# Patient Record
(demographics unavailable — no encounter records)

---

## 2025-03-19 NOTE — PROCEDURE
[FreeTextEntry1] : X-ray Report: (Right foot - 3 views) I got x-rays at Dr. Horner's office and I compared them. There is no shift or change. In fact it looks like he is sort of auto-fusing in certain areas of the 1st and 2nd ray. There is consolidation. He has medial calcinosis or calcification of the vasculature consistent with renal disease.

## 2025-03-19 NOTE — PHYSICAL EXAM
[1+] : left foot dorsalis pedis 1+ [Position Sense Dec.] : diminished position sense at the level of the toes [Diminished Throughout Right Foot] : diminished sensation with monofilament testing throughout right foot [Diminished Throughout Left Foot] : diminished sensation with monofilament testing throughout left foot [Delayed in the Right Toes] : capillary refills normal in right toes [Delayed in the Left Toes] : capillary refills normal in the left toes [FreeTextEntry3] : Positive hair growth to all digits. [de-identified] : He has a flatfoot Charcot of the right side. there is no bony prominence and at the midfoot there is no prominence or skin irritation. He does have good signs of consolidation and even fusion about the 1st met. cuneiform joint. He has a sub 1 keratosis. He has slight sensitivity towards the 4th, 5th cuboid articulation. [FreeTextEntry4] : absent vibratory  [FreeTextEntry8] : absent vibratory  [FreeTextEntry1] : Peripheral neuropathy.

## 2025-03-19 NOTE — HISTORY OF PRESENT ILLNESS
[FreeTextEntry1] : Patient presents today for multiple fractures of the right midfoot, Charcot type injury. He has peripheral neuropathy and renal disease. He does not have diabetes. He is on dialysis. He noticed some increase in pain although it is recently feeling better.

## 2025-03-19 NOTE — ASSESSMENT
[FreeTextEntry1] : Impression: Flatfoot. Charcot foot. Pain.  Treatment:  There is certainly no need for surgery. He has good stability type shoes with orthotics that are really helpful, and I think he should continue to use. He does have a gauntlet type brace, but he doesn't like to use it. I trimmed the keratosis. I recommended Aquaphor to this area. I sent him for P4 topical cream and recommended a silicon impregnated sleeve to use just to give a little more support and cushion to the area. I think he should use slippers in the house, and this should be monitored on a yearly basis.

## 2025-03-19 NOTE — CONSULT LETTER
[Dear  ___] : Dear  [unfilled], [Courtesy Letter:] : I had the pleasure of seeing your patient, [unfilled], in my office today. [Please see my note below.] : Please see my note below. [Consult Closing:] : Thank you very much for allowing me to participate in the care of this patient.  If you have any questions, please do not hesitate to contact me. [Sincerely,] : Sincerely, [FreeTextEntry2] : Ramakrishna Horner DPM [phone] [place] [address] Fax: [phone]  [FreeTextEntry3] : Caleb Sarmiento DPM

## 2025-03-19 NOTE — PHYSICAL EXAM
[1+] : left foot dorsalis pedis 1+ [Position Sense Dec.] : diminished position sense at the level of the toes [Diminished Throughout Right Foot] : diminished sensation with monofilament testing throughout right foot [Diminished Throughout Left Foot] : diminished sensation with monofilament testing throughout left foot [Delayed in the Right Toes] : capillary refills normal in right toes [Delayed in the Left Toes] : capillary refills normal in the left toes [FreeTextEntry3] : Positive hair growth to all digits. [de-identified] : He has a flatfoot Charcot of the right side. there is no bony prominence and at the midfoot there is no prominence or skin irritation. He does have good signs of consolidation and even fusion about the 1st met. cuneiform joint. He has a sub 1 keratosis. He has slight sensitivity towards the 4th, 5th cuboid articulation. [FreeTextEntry4] : absent vibratory  [FreeTextEntry8] : absent vibratory  [FreeTextEntry1] : Peripheral neuropathy.

## 2025-03-19 NOTE — CONSULT LETTER
[Dear  ___] : Dear  [unfilled], [Courtesy Letter:] : I had the pleasure of seeing your patient, [unfilled], in my office today. [Please see my note below.] : Please see my note below. [Consult Closing:] : Thank you very much for allowing me to participate in the care of this patient.  If you have any questions, please do not hesitate to contact me. [Sincerely,] : Sincerely, [FreeTextEntry2] : Ramakrishna Horner DPM [phone] [place] [address] Fax: [phone]  [FreeTextEntry3] : Caleb Sarmiento DPM

## 2025-03-19 NOTE — CONSULT LETTER
[Dear  ___] : Dear  [unfilled], [Courtesy Letter:] : I had the pleasure of seeing your patient, [unfilled], in my office today. [Please see my note below.] : Please see my note below. [Consult Closing:] : Thank you very much for allowing me to participate in the care of this patient.  If you have any questions, please do not hesitate to contact me. [Sincerely,] : Sincerely, [FreeTextEntry2] : Ramakrishna Horner DPM [phone] [place] [address] Fax: [phone]  [FreeTextEntry3] : Caleb Sarmiento DPM

## 2025-03-19 NOTE — PHYSICAL EXAM
[1+] : left foot dorsalis pedis 1+ [Position Sense Dec.] : diminished position sense at the level of the toes [Diminished Throughout Right Foot] : diminished sensation with monofilament testing throughout right foot [Diminished Throughout Left Foot] : diminished sensation with monofilament testing throughout left foot [Delayed in the Right Toes] : capillary refills normal in right toes [Delayed in the Left Toes] : capillary refills normal in the left toes [FreeTextEntry3] : Positive hair growth to all digits. [de-identified] : He has a flatfoot Charcot of the right side. there is no bony prominence and at the midfoot there is no prominence or skin irritation. He does have good signs of consolidation and even fusion about the 1st met. cuneiform joint. He has a sub 1 keratosis. He has slight sensitivity towards the 4th, 5th cuboid articulation. [FreeTextEntry4] : absent vibratory  [FreeTextEntry8] : absent vibratory  [FreeTextEntry1] : Peripheral neuropathy.